# Patient Record
Sex: MALE
[De-identification: names, ages, dates, MRNs, and addresses within clinical notes are randomized per-mention and may not be internally consistent; named-entity substitution may affect disease eponyms.]

---

## 2020-07-30 ENCOUNTER — DASHBOARD ENCOUNTERS (OUTPATIENT)
Age: 65
End: 2020-07-30

## 2020-07-30 ENCOUNTER — OFFICE VISIT (OUTPATIENT)
Dept: URBAN - METROPOLITAN AREA CLINIC 82 | Facility: CLINIC | Age: 65
End: 2020-07-30

## 2020-07-30 ENCOUNTER — OFFICE VISIT (OUTPATIENT)
Dept: URBAN - METROPOLITAN AREA CLINIC 82 | Facility: CLINIC | Age: 65
End: 2020-07-30
Payer: MEDICARE

## 2020-07-30 DIAGNOSIS — K62.89 RECTAL PAIN: ICD-10-CM

## 2020-07-30 PROCEDURE — 99213 OFFICE O/P EST LOW 20 MIN: CPT | Performed by: INTERNAL MEDICINE

## 2020-07-30 RX ORDER — GLIMEPIRIDE 2 MG/1
TAKE 1 TABLET (2 MG) BY ORAL ROUTE ONCE DAILY TABLET ORAL 1
Qty: 0 | Refills: 0 | Status: ACTIVE | COMMUNITY
Start: 1900-01-01

## 2020-07-30 RX ORDER — METFORMIN HYDROCHLORIDE 1000 MG/1
TAKE 1 TABLET (1,000 MG) BY ORAL ROUTE 2 TIMES PER DAY WITH MORNING AND EVENING MEALS TABLET, COATED ORAL 2
Qty: 0 | Refills: 0 | Status: ACTIVE | COMMUNITY
Start: 1900-01-01

## 2020-07-30 RX ORDER — HYDROCORTISONE ACETATE 25 MG/1
1 SUPPOSITORY SUPPOSITORY RECTAL BID
Qty: 14 | Refills: 3 | OUTPATIENT
Start: 2020-07-30 | End: 2020-08-27

## 2020-07-30 RX ORDER — OMEPRAZOLE 40 MG/1
TAKE 1 CAPSULE (40 MG) BY ORAL ROUTE ONCE DAILY BEFORE A MEAL FOR 90 DAYS CAPSULE, DELAYED RELEASE PELLETS ORAL
Qty: 90 | Refills: 3 | Status: ACTIVE | COMMUNITY
Start: 2020-01-18 | End: 2021-01-12

## 2020-07-30 RX ORDER — LINAGLIPTIN 5 MG/1
TAKE 1 TABLET (5 MG) BY ORAL ROUTE ONCE DAILY TABLET, FILM COATED ORAL 1
Qty: 0 | Refills: 0 | Status: ACTIVE | COMMUNITY
Start: 1900-01-01

## 2020-07-30 RX ORDER — ATORVASTATIN CALCIUM 10 MG/1
TAKE 1 TABLET (10 MG) BY ORAL ROUTE ONCE DAILY AT BEDTIME TABLET, FILM COATED ORAL 1
Qty: 0 | Refills: 0 | Status: ACTIVE | COMMUNITY
Start: 1900-01-01

## 2020-07-30 RX ORDER — METOPROLOL TARTRATE 25 MG/1
TAKE 1 TABLET (25 MG) BY ORAL ROUTE 2 TIMES PER DAY TABLET, FILM COATED ORAL 2
Qty: 0 | Refills: 0 | Status: ACTIVE | COMMUNITY
Start: 1900-01-01

## 2020-07-30 RX ORDER — NORTRIPTYLINE HYDROCHLORIDE 50 MG/1
TAKE 1 CAPSULE (50 MG) BY ORAL ROUTE 3 TIMES PER DAY CAPSULE ORAL
Qty: 0 | Refills: 0 | Status: ACTIVE | COMMUNITY
Start: 1900-01-01

## 2020-07-30 NOTE — HPI-OTHER HISTORIES
complaining of rectal pain that has been persistent and gets worse with movements and hot weather.  has tried dicyclomine with no improvements.  Denies rectal bleeding

## 2020-07-31 LAB
APPEARANCE: CLEAR
BACTERIA: (no result)
BILIRUBIN: NEGATIVE
CAST TYPE: (no result)
CASTS: (no result)
COMMENT: (no result)
CRYSTAL TYPE: (no result)
CRYSTALS: (no result)
EPITHELIAL CELLS (NON RENAL): (no result)
EPITHELIAL CELLS (RENAL): (no result)
GLUCOSE: (no result)
KETONES: NEGATIVE
MICROSCOPIC EXAMINATION: (no result)
MICROSCOPIC EXAMINATION: (no result)
MUCUS THREADS: (no result)
NITRITE, URINE: NEGATIVE
OCCULT BLOOD: NEGATIVE
PH: 5.5
PROTEIN: NEGATIVE
RBC: (no result)
SPECIFIC GRAVITY: 1.01
TRICHOMONAS: (no result)
URINE-COLOR: YELLOW
UROBILINOGEN,SEMI-QN: 0.2
WBC ESTERASE: (no result)
WBC: (no result)
YEAST: (no result)

## 2020-08-03 ENCOUNTER — TELEPHONE ENCOUNTER (OUTPATIENT)
Dept: URBAN - METROPOLITAN AREA CLINIC 92 | Facility: CLINIC | Age: 65
End: 2020-08-03

## 2020-08-03 RX ORDER — CIPROFLOXACIN 500 MG/1
1 TABLET TABLET, FILM COATED ORAL
Qty: 20 | OUTPATIENT
Start: 2020-08-07 | End: 2020-08-17

## 2020-08-27 ENCOUNTER — OFFICE VISIT (OUTPATIENT)
Dept: URBAN - METROPOLITAN AREA CLINIC 82 | Facility: CLINIC | Age: 65
End: 2020-08-27